# Patient Record
Sex: MALE | Race: WHITE
[De-identification: names, ages, dates, MRNs, and addresses within clinical notes are randomized per-mention and may not be internally consistent; named-entity substitution may affect disease eponyms.]

---

## 2019-06-01 ENCOUNTER — HOSPITAL ENCOUNTER (EMERGENCY)
Dept: HOSPITAL 25 - UCCORT | Age: 49
Discharge: HOME | End: 2019-06-01
Payer: COMMERCIAL

## 2019-06-01 VITALS — DIASTOLIC BLOOD PRESSURE: 86 MMHG | SYSTOLIC BLOOD PRESSURE: 124 MMHG
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
## 2019-06-01 DIAGNOSIS — T15.91XA: Primary | ICD-10-CM
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
## 2019-06-01 PROCEDURE — G0463 HOSPITAL OUTPT CLINIC VISIT: HCPCS

## 2019-06-01 PROCEDURE — 65220 REMOVE FOREIGN BODY FROM EYE: CPT

## 2019-06-01 PROCEDURE — 99202 OFFICE O/P NEW SF 15 MIN: CPT

## 2019-12-07 ENCOUNTER — HOSPITAL ENCOUNTER (EMERGENCY)
Dept: HOSPITAL 25 - UCCORT | Age: 49
Discharge: HOME | End: 2019-12-07
Payer: COMMERCIAL

## 2019-12-07 VITALS — DIASTOLIC BLOOD PRESSURE: 95 MMHG | SYSTOLIC BLOOD PRESSURE: 145 MMHG

## 2019-12-07 DIAGNOSIS — F17.210: ICD-10-CM
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
## 2019-12-07 DIAGNOSIS — T15.92XA: Primary | ICD-10-CM
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
## 2019-12-07 PROCEDURE — 99212 OFFICE O/P EST SF 10 MIN: CPT

## 2019-12-07 PROCEDURE — G0463 HOSPITAL OUTPT CLINIC VISIT: HCPCS

## 2019-12-07 NOTE — UC
Eye Complaint HPI





- HPI Summary


HPI Summary: 





Pt presents with c/o of FB in left eye that occurred last evening.  Pt is a 

 and states that he was welding and felt a piece of metal "fly into" his 

eye. Pt states that his tetanus is UTD. 





- History of Current Complaint


Chief Complaint: UCEye


Stated Complaint: LEFT EYE FOREIGN MATERIAL


Time Seen by Provider: 12/07/19 08:56


Hx Obtained From: Patient


Onset/Duration: Sudden Onset, Still Present


Timing: Constant


Severity Initially: Moderate


Severity Currently: Moderate


Pain Intensity: 0


Pain Scale Used: 0-10 Numeric


Location of Injury: Other - cornea


Character: Sharp, Foreign Body Sensation


Aggravating Factor(s): Light, Blinking


Associated Signs And Symptoms: Positive: Drainage (Clear)


Related History: Foreign Body





- Risk Factors


Penetrating Injury Risk Factor: Projectile


Globe Rupture Risk Factors: Negative


Acute Glaucoma Risk Factors: Negative


Optic Artery Occlusion Risk Factors: Negative





- Allergies/Home Medications


Allergies/Adverse Reactions: 


 Allergies











Allergy/AdvReac Type Severity Reaction Status Date / Time


 


No Known Allergies Allergy   Verified 12/07/19 08:38














PMH/Surg Hx/FS Hx/Imm Hx


Previously Healthy: Yes





- Surgical History


Surgical History: Yes


Surgery Procedure, Year, and Place: hernia repair x 2





- Family History


Known Family History: Positive: Hypertension





- Social History


Occupation: Employed Full-time


Lives: With Family


Alcohol Use: None


Substance Use Type: Excessive Caffeine, Marijuana


Substance Use Comment - Amount & Last Used: occasionally smoke


Smoking Status (MU): Heavy Every Day Tobacco Smoker


Type: Cigarettes


Amount Used/How Often: 1/2 ppd


Have You Smoked in the Last Year: Yes





- Immunization History


Vaccination Up to Date: Yes





Review of Systems


All Other Systems Reviewed And Are Negative: Yes


Constitutional: Positive: Negative


Skin: Positive: Negative


Eyes: Positive: Drainage - clear, Other - FB left eye


ENT: Positive: Negative


Respiratory: Positive: Negative


Cardiovascular: Positive: Negative


Gastrointestinal: Positive: Negative


Genitourinary: Positive: Negative


Motor: Positive: Negative


Neurovascular: Positive: Negative


Musculoskeletal: Positive: Negative


Neurological: Positive: Negative


Psychological: Positive: Negative


Is Patient Immunocompromised?: No





Physical Exam


Triage Information Reviewed: Yes


Appearance: Well-Appearing


Vital Signs: 


 Initial Vital Signs











Temp  98.6 F   12/07/19 08:33


 


Pulse  74   12/07/19 08:33


 


Resp  18   12/07/19 08:33


 


BP  145/95   12/07/19 08:33


 


Pulse Ox  98   12/07/19 08:33











Vital Signs Reviewed: Yes


Eye Exam: Other


Eyes: Positive: Other: - FB visible on left eye/ iris ~ 9 o'clock position


ENT Exam: Normal


Neck exam: Normal


Respiratory: Positive: No respiratory distress


Musculoskeletal Exam: Normal


Neurological Exam: Normal


Psychological Exam: Normal


Skin Exam: Normal





Eye Complaint Course/Dx





- Course


Course Of Treatment: 





left eye I placed 3 gtts of tetracaine opthalmic drops, attempted to remove FB 

with Q-tip and 18 guage needle.  with 18 gauge needle, ~ 1/2 of metal was 

removed. 


I discussed this with the pt and I tried to get him an appointment at Dr. Valles'

s office.  They do not accept his insurance and the pt could not afford to pay 

out of pocket.  Pt was then referred to ER in [place] and pt stated he would 

prefer to not go and would f/u with local optometrist on Monday and go to ER if 

symptoms worsen and pain intolerable. 





- Differential Dx/Diagnosis


Differential Diagnosis/HQI/PQRI: Foreign Body, Penetrating Injury


Provider Diagnosis: 


 Foreign body of left eye








Discharge ED





- Sign-Out/Discharge


Documenting (check all that apply): Patient Departure


All imaging exams completed and their final reports reviewed: No Studies





- Discharge Plan


Condition: Stable


Disposition: HOME


Prescriptions: 


Ofloxacin 0.3% (Eye Drop) [Ocuflox OPTH 0.3% (Eye Drop)] 2 drop LEFT EYE Q4H #1 

btl


Patient Education Materials:  Eye Foreign Body (ED)


Referrals: 


Efren Little OD [Doctor of Osteopathy] - As Soon As Possible


No Primary Care Phys,NOPCP [Primary Care Provider] - 


Chang Adams MD [Medical Doctor] - If Needed


Additional Instructions: 


Please follow up with an eye care specialist as soon as possible. 





- Billing Disposition and Condition


Condition: STABLE


Disposition: Home